# Patient Record
Sex: FEMALE | Race: WHITE | NOT HISPANIC OR LATINO
[De-identification: names, ages, dates, MRNs, and addresses within clinical notes are randomized per-mention and may not be internally consistent; named-entity substitution may affect disease eponyms.]

---

## 2019-03-05 PROBLEM — Z00.00 ENCOUNTER FOR PREVENTIVE HEALTH EXAMINATION: Status: ACTIVE | Noted: 2019-03-05

## 2019-03-06 ENCOUNTER — APPOINTMENT (OUTPATIENT)
Dept: DERMATOLOGY | Facility: CLINIC | Age: 79
End: 2019-03-06
Payer: MEDICARE

## 2019-03-06 PROCEDURE — 99203 OFFICE O/P NEW LOW 30 MIN: CPT | Mod: 25

## 2019-03-06 PROCEDURE — 11102 TANGNTL BX SKIN SINGLE LES: CPT

## 2019-04-04 ENCOUNTER — APPOINTMENT (OUTPATIENT)
Dept: DERMATOLOGY | Facility: CLINIC | Age: 79
End: 2019-04-04
Payer: MEDICARE

## 2019-04-04 PROCEDURE — 99214 OFFICE O/P EST MOD 30 MIN: CPT

## 2019-04-25 ENCOUNTER — MESSAGE (OUTPATIENT)
Age: 79
End: 2019-04-25

## 2019-04-25 ENCOUNTER — APPOINTMENT (OUTPATIENT)
Dept: DERMATOLOGY | Facility: CLINIC | Age: 79
End: 2019-04-25
Payer: MEDICARE

## 2019-04-25 PROCEDURE — 17311 MOHS 1 STAGE H/N/HF/G: CPT

## 2019-04-25 PROCEDURE — 15260 FTH/GFT FR N/E/E/L 20 SQCM/<: CPT

## 2019-05-01 ENCOUNTER — APPOINTMENT (OUTPATIENT)
Dept: DERMATOLOGY | Facility: CLINIC | Age: 79
End: 2019-05-01
Payer: MEDICARE

## 2019-05-01 PROCEDURE — 99024 POSTOP FOLLOW-UP VISIT: CPT

## 2019-06-13 ENCOUNTER — APPOINTMENT (OUTPATIENT)
Dept: DERMATOLOGY | Facility: CLINIC | Age: 79
End: 2019-06-13
Payer: MEDICARE

## 2019-06-13 PROCEDURE — 99024 POSTOP FOLLOW-UP VISIT: CPT

## 2019-08-15 ENCOUNTER — APPOINTMENT (OUTPATIENT)
Dept: DERMATOLOGY | Facility: CLINIC | Age: 79
End: 2019-08-15
Payer: MEDICARE

## 2019-08-15 PROCEDURE — 99212 OFFICE O/P EST SF 10 MIN: CPT

## 2020-01-23 ENCOUNTER — APPOINTMENT (OUTPATIENT)
Dept: DERMATOLOGY | Facility: CLINIC | Age: 80
End: 2020-01-23
Payer: MEDICARE

## 2020-01-23 DIAGNOSIS — C44.311 BASAL CELL CARCINOMA OF SKIN OF NOSE: ICD-10-CM

## 2020-01-23 PROCEDURE — 99213 OFFICE O/P EST LOW 20 MIN: CPT

## 2022-03-17 ENCOUNTER — OUTPATIENT (OUTPATIENT)
Dept: OUTPATIENT SERVICES | Facility: HOSPITAL | Age: 82
LOS: 1 days | End: 2022-03-17
Payer: MEDICARE

## 2022-03-17 DIAGNOSIS — R13.19 OTHER DYSPHAGIA: ICD-10-CM

## 2022-03-17 PROCEDURE — 74230 X-RAY XM SWLNG FUNCJ C+: CPT | Mod: 26

## 2022-03-17 PROCEDURE — 74230 X-RAY XM SWLNG FUNCJ C+: CPT

## 2022-03-17 NOTE — SWALLOW VFSS/MBS ASSESSMENT ADULT - PHARYNGEAL PHASE COMMENTS
Significant difficulty for progression of PO through pharyngeal space, almost whole bolus remaining in vallecular space. PO eventually passes, however limited, and resultant aspiration during & following the swallow observed

## 2022-03-17 NOTE — SWALLOW VFSS/MBS ASSESSMENT ADULT - RECOMMENDED CONSISTENCY
Continue NPO w/non-oral alternative means of nutrition/hydration via PEG, as per Pt/family wishes  Rx GOC discussion with MD trejo: wishes for nutrition/hydration, as Pt stating, "I don't want to live the rest of my life like this"

## 2022-03-17 NOTE — SWALLOW VFSS/MBS ASSESSMENT ADULT - DIAGNOSTIC IMPRESSIONS
Pt presents w/severe oropharyngeal dysphagia. Oral stage marked by limited mandibular depression due to trismus, resulting in poor oral grading via utensil. Decreased bolus formation/cohesion/propulsion, subsequent mod increased oral transit time. Premature pharyngeal entry noted to the level of the valleculae w/single consistency trialed puree. Additional velopharyngeal insufficiency visualized, w/retrograde progression of trials into the nasal cavity.     Pharyngeal phase of swallow marked by poor contractility, almost absent epiglottic deflection & significantly reduced hyo-laryngeal elevation/excursion. Poor progression of PO trials, initially unable to pass beyond the level of the valleculae. +Severe stasis noted, resulting in substantial aspiration w/immediate sensation, noted during & following the swallow. No benefit for airway protection despite chin tuck posture. <50% of PO able to progress through pharyngeal space following subsequent swallow, however significant residue remaining. Pt unable to clear aspirated material or stasis from laryngeal space or pharynx, despite cued effortful expectoration. Pt with significant/prolonged coughing noted at completion of study. PO initiation is contraindicated at this time.

## 2022-03-17 NOTE — SWALLOW VFSS/MBS ASSESSMENT ADULT - ORAL PHASE
Delayed oral transit time/Reduced anterior - posterior transport/Residue in oral cavity/Incomplete tongue to palate contact/Uncontrolled bolus / spillover in lali-pharynx

## 2022-03-17 NOTE — SWALLOW VFSS/MBS ASSESSMENT ADULT - SLP PERTINENT HISTORY OF CURRENT PROBLEM
Pt is an 80 y/o F with reported PMH of L breast CA, with radiation to both breast and ?L leg, unable to identify when. States hx of dysphagia since September of 2021. Pt s/p MBSV (report she had study done here - however no record of if in EMR), rx made for NPO, PEG placed at that time. Pt states she has been receiving dysphagia tx in the outpatient setting 2x a week, primary therapist referring for MBSV to determine if with improved swallow function. Pt is a questionable historian. 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 Mohs sx & reconstruction via full thickness skin graft via right pre-auricle donor in April of 2019.

## 2022-03-17 NOTE — SWALLOW VFSS/MBS ASSESSMENT ADULT - DEMONSTRATES NEED FOR REFERRAL TO ANOTHER SERVICE
rx social work/psych consult - concern for current unsafe living situation - Pt reports she lives [place] with no assist/GI/psychology/social work rx social work/psych consult - concern for current unsafe living situation - Pt reports she lives [place] with no assist/GI/neurology/psychology/social work

## 2022-03-17 NOTE — SWALLOW VFSS/MBS ASSESSMENT ADULT - SLP GENERAL OBSERVATIONS
Pt recd awake/upright in chair in radiology, awake/alert, reduced cognition, moderately dysarthric speech, significant trismus, 0/10 pain pre/post, tolerating RA no overt distress.

## 2023-07-21 ENCOUNTER — EMERGENCY (EMERGENCY)
Facility: HOSPITAL | Age: 83
LOS: 1 days | Discharge: HOME | End: 2023-07-21
Attending: EMERGENCY MEDICINE
Payer: MEDICARE

## 2023-07-21 VITALS
TEMPERATURE: 98 F | OXYGEN SATURATION: 98 % | WEIGHT: 95.9 LBS | RESPIRATION RATE: 20 BRPM | HEIGHT: 62 IN | HEART RATE: 76 BPM | DIASTOLIC BLOOD PRESSURE: 72 MMHG | SYSTOLIC BLOOD PRESSURE: 111 MMHG

## 2023-07-21 LAB
ALBUMIN SERPL ELPH-MCNC: 3.8 G/DL — SIGNIFICANT CHANGE UP (ref 3.3–5.2)
ALP SERPL-CCNC: 84 U/L — SIGNIFICANT CHANGE UP (ref 40–120)
ALT FLD-CCNC: 10 U/L — SIGNIFICANT CHANGE UP
ANION GAP SERPL CALC-SCNC: 13 MMOL/L — SIGNIFICANT CHANGE UP (ref 5–17)
APTT BLD: 32.3 SEC — SIGNIFICANT CHANGE UP (ref 27.5–35.5)
AST SERPL-CCNC: 24 U/L — SIGNIFICANT CHANGE UP
BASE EXCESS BLDV CALC-SCNC: 6.6 MMOL/L — HIGH (ref -2–3)
BASOPHILS # BLD AUTO: 0.05 K/UL — SIGNIFICANT CHANGE UP (ref 0–0.2)
BASOPHILS NFR BLD AUTO: 0.7 % — SIGNIFICANT CHANGE UP (ref 0–2)
BILIRUB SERPL-MCNC: 0.5 MG/DL — SIGNIFICANT CHANGE UP (ref 0.4–2)
BUN SERPL-MCNC: 20 MG/DL — SIGNIFICANT CHANGE UP (ref 8–20)
CA-I SERPL-SCNC: 1.17 MMOL/L — SIGNIFICANT CHANGE UP (ref 1.15–1.33)
CALCIUM SERPL-MCNC: 9.5 MG/DL — SIGNIFICANT CHANGE UP (ref 8.4–10.5)
CHLORIDE BLDV-SCNC: 104 MMOL/L — SIGNIFICANT CHANGE UP (ref 96–108)
CHLORIDE SERPL-SCNC: 102 MMOL/L — SIGNIFICANT CHANGE UP (ref 96–108)
CO2 SERPL-SCNC: 28 MMOL/L — SIGNIFICANT CHANGE UP (ref 22–29)
CREAT SERPL-MCNC: 0.59 MG/DL — SIGNIFICANT CHANGE UP (ref 0.5–1.3)
EGFR: 89 ML/MIN/1.73M2 — SIGNIFICANT CHANGE UP
EOSINOPHIL # BLD AUTO: 0.1 K/UL — SIGNIFICANT CHANGE UP (ref 0–0.5)
EOSINOPHIL NFR BLD AUTO: 1.4 % — SIGNIFICANT CHANGE UP (ref 0–6)
GAS PNL BLDV: 137 MMOL/L — SIGNIFICANT CHANGE UP (ref 136–145)
GAS PNL BLDV: SIGNIFICANT CHANGE UP
GAS PNL BLDV: SIGNIFICANT CHANGE UP
GLUCOSE BLDV-MCNC: 85 MG/DL — SIGNIFICANT CHANGE UP (ref 70–99)
GLUCOSE SERPL-MCNC: 82 MG/DL — SIGNIFICANT CHANGE UP (ref 70–99)
HCO3 BLDV-SCNC: 31 MMOL/L — HIGH (ref 22–29)
HCT VFR BLD CALC: 37.2 % — SIGNIFICANT CHANGE UP (ref 34.5–45)
HCT VFR BLDA CALC: 39 % — SIGNIFICANT CHANGE UP
HGB BLD CALC-MCNC: 12.9 G/DL — SIGNIFICANT CHANGE UP (ref 11.7–16.1)
HGB BLD-MCNC: 12 G/DL — SIGNIFICANT CHANGE UP (ref 11.5–15.5)
IMM GRANULOCYTES NFR BLD AUTO: 0.3 % — SIGNIFICANT CHANGE UP (ref 0–0.9)
INR BLD: 1.14 RATIO — SIGNIFICANT CHANGE UP (ref 0.88–1.16)
LACTATE BLDV-MCNC: 1.2 MMOL/L — SIGNIFICANT CHANGE UP (ref 0.5–2)
LIDOCAIN IGE QN: 38 U/L — SIGNIFICANT CHANGE UP (ref 22–51)
LYMPHOCYTES # BLD AUTO: 1.44 K/UL — SIGNIFICANT CHANGE UP (ref 1–3.3)
LYMPHOCYTES # BLD AUTO: 20.2 % — SIGNIFICANT CHANGE UP (ref 13–44)
MCHC RBC-ENTMCNC: 30.2 PG — SIGNIFICANT CHANGE UP (ref 27–34)
MCHC RBC-ENTMCNC: 32.3 GM/DL — SIGNIFICANT CHANGE UP (ref 32–36)
MCV RBC AUTO: 93.7 FL — SIGNIFICANT CHANGE UP (ref 80–100)
MONOCYTES # BLD AUTO: 0.68 K/UL — SIGNIFICANT CHANGE UP (ref 0–0.9)
MONOCYTES NFR BLD AUTO: 9.6 % — SIGNIFICANT CHANGE UP (ref 2–14)
NEUTROPHILS # BLD AUTO: 4.83 K/UL — SIGNIFICANT CHANGE UP (ref 1.8–7.4)
NEUTROPHILS NFR BLD AUTO: 67.8 % — SIGNIFICANT CHANGE UP (ref 43–77)
PCO2 BLDV: 48 MMHG — HIGH (ref 39–42)
PH BLDV: 7.42 — SIGNIFICANT CHANGE UP (ref 7.32–7.43)
PLATELET # BLD AUTO: 302 K/UL — SIGNIFICANT CHANGE UP (ref 150–400)
PO2 BLDV: 58 MMHG — HIGH (ref 25–45)
POTASSIUM BLDV-SCNC: 4.6 MMOL/L — SIGNIFICANT CHANGE UP (ref 3.5–5.1)
POTASSIUM SERPL-MCNC: 4.2 MMOL/L — SIGNIFICANT CHANGE UP (ref 3.5–5.3)
POTASSIUM SERPL-SCNC: 4.2 MMOL/L — SIGNIFICANT CHANGE UP (ref 3.5–5.3)
PROT SERPL-MCNC: 7.2 G/DL — SIGNIFICANT CHANGE UP (ref 6.6–8.7)
PROTHROM AB SERPL-ACNC: 13.2 SEC — SIGNIFICANT CHANGE UP (ref 10.5–13.4)
RBC # BLD: 3.97 M/UL — SIGNIFICANT CHANGE UP (ref 3.8–5.2)
RBC # FLD: 12.7 % — SIGNIFICANT CHANGE UP (ref 10.3–14.5)
SAO2 % BLDV: 90.9 % — SIGNIFICANT CHANGE UP
SODIUM SERPL-SCNC: 143 MMOL/L — SIGNIFICANT CHANGE UP (ref 135–145)
TROPONIN T SERPL-MCNC: 0.01 NG/ML — SIGNIFICANT CHANGE UP (ref 0–0.06)
WBC # BLD: 7.12 K/UL — SIGNIFICANT CHANGE UP (ref 3.8–10.5)
WBC # FLD AUTO: 7.12 K/UL — SIGNIFICANT CHANGE UP (ref 3.8–10.5)

## 2023-07-21 PROCEDURE — 99285 EMERGENCY DEPT VISIT HI MDM: CPT

## 2023-07-21 NOTE — ED ADULT TRIAGE NOTE - CHIEF COMPLAINT QUOTE
pt BIBA as per EMS NH staff states that she was yelling and was trying to leave, pt then received 2mg ativan PO. pt co hernia for 8 months said no one has checked it out. pt has Gtube. NH wants social work involved for pt

## 2023-07-21 NOTE — ED PROVIDER NOTE - OBJECTIVE STATEMENT
83-year-old female brought in by EMS for reported agitation at nursing home.  Patient on the initial presentation, says she has been having abdominal pain and swelling near her G-tube site for months.  Denies fever/chills, nausea/vomiting, chest pain, shortness of breath.

## 2023-07-21 NOTE — ED PROVIDER NOTE - ATTENDING CONTRIBUTION TO CARE
Dr. Tsai, Attending Physician-  I performed a face to face bedside interview with patient regarding history of present illness, review of symptoms and past medical history. I completed an independent physical exam.  I have discussed patient's plan of care with the resident.

## 2023-07-21 NOTE — ED PROVIDER NOTE - PATIENT PORTAL LINK FT
You can access the FollowMyHealth Patient Portal offered by [facility] by registering at the following website: [email or web address]. By joining [employer]’s FollowMyHealth portal, you will also be able to view your health information using other applications (apps) compatible with our system.

## 2023-07-21 NOTE — ED PROVIDER NOTE - PHYSICAL EXAMINATION
General: NAD  Head: NC, AT  EENT: EOMI, no scleral icterus  Cardiac: RRR, no apparent murmurs, no lower extremity edema  Respiratory: CTABL, no respiratory distress   Abdomen: soft, ND, PEG tube in place, some TTP surrounding site, otherwise clean/dry/intact, nonperitonitic  MSK/Vascular: full ROM, soft compartments, warm extremities  Neuro: AAOx3, sensation to light touch intact  Psych: calm, cooperative, appropriate affect

## 2023-07-21 NOTE — ED PROVIDER NOTE - PROGRESS NOTE DETAILS
Spoke with Elva (RN from [place]) regarding obtaining further phone numbers to obtain collateral. Pedro Small  and Carl Muñiz   Beck Dejesus MD [place]: Patient increasingly agitated, ambulating around the ED, yelling to leave. Will medicate with Zyprexa - still pending psych eval. [place]: Patient cleared by psych - recommended stopping Mirtazapine, Changing Seroquel 25 mg qhs with PRNs at 0800/0900 and 1500/1600, Continuing Depakote 250 mg BID. Social work aware.

## 2023-07-21 NOTE — ED ADULT NURSE REASSESSMENT NOTE - NS ED NURSE REASSESS COMMENT FT1
Pt is resting in bed comfortably at this time, no apparent distress noted at this time. pt safety maintained. Pt denies any complaints at this time. pt updated on plan of care. Pt awaiting CT

## 2023-07-21 NOTE — ED PROVIDER NOTE - CLINICAL SUMMARY MEDICAL DECISION MAKING FREE TEXT BOX
82 y/o female brought in from nursing home for agitation, c/o abd pain surrounding PEG tube site. Labs and CT Abd/Pelv ordered. Pt displaying no signs of agitation in the ER,  consulted for eval prior to dispo.

## 2023-07-21 NOTE — ED PROVIDER NOTE - NSFOLLOWUPINSTRUCTIONS_ED_ALL_ED_FT
Stop Mirtazapine.  Change Seroquel to 25 mg at night, with PRN dosages of 25 mg at 8am/9am and 3pm/4pm.  Continue Depakote 250 mg BID.    Patient otherwise medically cleared - can follow up with her regular doctor.

## 2023-07-21 NOTE — ED ADULT NURSE NOTE - OBJECTIVE STATEMENT
Pt care acquired at 1530. Pt is A&Ox4 and is resting in bed. Pt arrives to ED c/o hernia, and peg tube that has been in for 8 months. Pt states she has discomfort with the tube. Previous charting states pt facility wants her to stay for sw consult. Pt acting appropriate on initial assessment. pt breathing unlabored on room air. Pt denies SI/HI. VSS.

## 2023-07-21 NOTE — ED ADULT NURSE NOTE - NSFALLUNIVINTERV_ED_ALL_ED
Bed/Stretcher in lowest position, wheels locked, appropriate side rails in place/Call bell, personal items and telephone in reach/Instruct patient to call for assistance before getting out of bed/chair/stretcher/Non-slip footwear applied when patient is off stretcher/[place] to call system/Physically safe environment - no spills, clutter or unnecessary equipment/Purposeful proactive rounding/Room/bathroom lighting operational, light cord in reach No

## 2023-07-21 NOTE — ED ADULT NURSE REASSESSMENT NOTE - NS ED NURSE REASSESS COMMENT FT1
received report from day RN, assumed care of pt at change of shift.  pt here c/o hernia for 8 months that she has never been seen for.  pt also has a gtube.  denies sob, cp, nvd.  pt from NH, [facility] social work.  pending abd ct

## 2023-07-22 VITALS
SYSTOLIC BLOOD PRESSURE: 126 MMHG | HEART RATE: 85 BPM | TEMPERATURE: 99 F | OXYGEN SATURATION: 95 % | DIASTOLIC BLOOD PRESSURE: 84 MMHG | RESPIRATION RATE: 18 BRPM

## 2023-07-22 DIAGNOSIS — F03.918 UNSPECIFIED DEMENTIA, UNSPECIFIED SEVERITY, WITH OTHER BEHAVIORAL DISTURBANCE: ICD-10-CM

## 2023-07-22 LAB
APAP SERPL-MCNC: <3 UG/ML — LOW (ref 10–26)
ETHANOL SERPL-MCNC: <10 MG/DL — SIGNIFICANT CHANGE UP (ref 0–9)
SALICYLATES SERPL-MCNC: <0.6 MG/DL — LOW (ref 10–20)

## 2023-07-22 PROCEDURE — 85730 THROMBOPLASTIN TIME PARTIAL: CPT

## 2023-07-22 PROCEDURE — 85025 COMPLETE CBC W/AUTO DIFF WBC: CPT

## 2023-07-22 PROCEDURE — 93005 ELECTROCARDIOGRAM TRACING: CPT

## 2023-07-22 PROCEDURE — 84484 ASSAY OF TROPONIN QUANT: CPT

## 2023-07-22 PROCEDURE — 96372 THER/PROPH/DIAG INJ SC/IM: CPT

## 2023-07-22 PROCEDURE — 99285 EMERGENCY DEPT VISIT HI MDM: CPT | Mod: 25

## 2023-07-22 PROCEDURE — 84132 ASSAY OF SERUM POTASSIUM: CPT

## 2023-07-22 PROCEDURE — 74177 CT ABD & PELVIS W/CONTRAST: CPT | Mod: 26,MA

## 2023-07-22 PROCEDURE — 80053 COMPREHEN METABOLIC PANEL: CPT

## 2023-07-22 PROCEDURE — 82330 ASSAY OF CALCIUM: CPT

## 2023-07-22 PROCEDURE — 82435 ASSAY OF BLOOD CHLORIDE: CPT

## 2023-07-22 PROCEDURE — 90792 PSYCH DIAG EVAL W/MED SRVCS: CPT

## 2023-07-22 PROCEDURE — 74177 CT ABD & PELVIS W/CONTRAST: CPT | Mod: MA

## 2023-07-22 PROCEDURE — 82947 ASSAY GLUCOSE BLOOD QUANT: CPT

## 2023-07-22 PROCEDURE — 93010 ELECTROCARDIOGRAM REPORT: CPT

## 2023-07-22 PROCEDURE — 80307 DRUG TEST PRSMV CHEM ANLYZR: CPT

## 2023-07-22 PROCEDURE — 36415 COLL VENOUS BLD VENIPUNCTURE: CPT

## 2023-07-22 PROCEDURE — 83605 ASSAY OF LACTIC ACID: CPT

## 2023-07-22 PROCEDURE — 85018 HEMOGLOBIN: CPT

## 2023-07-22 PROCEDURE — 85014 HEMATOCRIT: CPT

## 2023-07-22 PROCEDURE — 82962 GLUCOSE BLOOD TEST: CPT

## 2023-07-22 PROCEDURE — 82803 BLOOD GASES ANY COMBINATION: CPT

## 2023-07-22 PROCEDURE — 85610 PROTHROMBIN TIME: CPT

## 2023-07-22 PROCEDURE — 83690 ASSAY OF LIPASE: CPT

## 2023-07-22 PROCEDURE — 84295 ASSAY OF SERUM SODIUM: CPT

## 2023-07-22 RX ORDER — VALPROIC ACID (AS SODIUM SALT) 250 MG/5ML
125 SOLUTION, ORAL ORAL
Refills: 0 | Status: DISCONTINUED | OUTPATIENT
Start: 2023-07-22 | End: 2023-07-29

## 2023-07-22 RX ORDER — OLANZAPINE 15 MG/1
10 TABLET, FILM COATED ORAL ONCE
Refills: 0 | Status: COMPLETED | OUTPATIENT
Start: 2023-07-22 | End: 2023-07-22

## 2023-07-22 RX ORDER — ANASTROZOLE 1 MG/1
1 TABLET ORAL DAILY
Refills: 0 | Status: DISCONTINUED | OUTPATIENT
Start: 2023-07-22 | End: 2023-07-29

## 2023-07-22 RX ORDER — LEVOTHYROXINE SODIUM 125 MCG
88 TABLET ORAL DAILY
Refills: 0 | Status: DISCONTINUED | OUTPATIENT
Start: 2023-07-22 | End: 2023-07-29

## 2023-07-22 RX ORDER — MIRTAZAPINE 45 MG/1
7.5 TABLET, ORALLY DISINTEGRATING ORAL AT BEDTIME
Refills: 0 | Status: DISCONTINUED | OUTPATIENT
Start: 2023-07-22 | End: 2023-07-29

## 2023-07-22 RX ORDER — ATENOLOL 25 MG/1
25 TABLET ORAL DAILY
Refills: 0 | Status: DISCONTINUED | OUTPATIENT
Start: 2023-07-22 | End: 2023-07-29

## 2023-07-22 RX ORDER — SODIUM CHLORIDE 9 MG/ML
1000 INJECTION INTRAMUSCULAR; INTRAVENOUS; SUBCUTANEOUS ONCE
Refills: 0 | Status: COMPLETED | OUTPATIENT
Start: 2023-07-22 | End: 2023-07-22

## 2023-07-22 RX ADMIN — SODIUM CHLORIDE 1000 MILLILITER(S): 9 INJECTION INTRAMUSCULAR; INTRAVENOUS; SUBCUTANEOUS at 12:20

## 2023-07-22 RX ADMIN — OLANZAPINE 10 MILLIGRAM(S): 15 TABLET, FILM COATED ORAL at 19:28

## 2023-07-22 RX ADMIN — OLANZAPINE 10 MILLIGRAM(S): 15 TABLET, FILM COATED ORAL at 10:09

## 2023-07-22 NOTE — ED BEHAVIORAL HEALTH NOTE - BEHAVIORAL HEALTH NOTE
of note [facility] indicates past diagnoses of: metastatic breast cancer organic mental disorder cognitive impairment dysphagia h/o radiation and aromatase therapy Mets to lung and bone

## 2023-07-22 NOTE — ED ADULT NURSE REASSESSMENT NOTE - NS ED NURSE REASSESS COMMENT FT1
Patient awake, alert, ambulating with minimal assistance, VSS, awaiting for psych consult, resp even/unlabored, will continue to monitor patient.

## 2023-07-22 NOTE — ED ADULT NURSE REASSESSMENT NOTE - NS ED NURSE REASSESS COMMENT FT1
Pt is AOx2. Pt denies pain at this time. Pt has swelling at g tube. Abdomen soft.  Pt comfortably resting at this time.

## 2023-07-22 NOTE — ED BEHAVIORAL HEALTH ASSESSMENT NOTE - HPI (INCLUDE ILLNESS QUALITY, SEVERITY, DURATION, TIMING, CONTEXT, MODIFYING FACTORS, ASSOCIATED SIGNS AND SYMPTOMS)
Patient a 82 y/o female, who was sent from [facility] for abdominal pain. Patient is alert poor orientation, able to ambulate without assistance, has a G-tube in place and takes everything through G-Tube. Patient is feisty, refusing to cooperate, Mood is irritated, annoyed and disturbed. She refused to speak with writer, takes meds as given by the G. Tube.    Collateral info obtained from RN at [facility] who informed that she has been at [place] for almost a year, and her brother once dropped her at NH refused to participate for further care. She now wants to get out of the [facility], and the SW/Nursing had to block the elevator door. She started cursing, endorses that her cousin is sleeping downstairs as if she is focused on this cousin. She has her desire to go home and this started since she is moved to long term care and her brother has not been in picture once he dropped her off. she has good sleep, and takes all food/meds through G-tube. So she has to take meds whatever is prescribed to her. 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

## 2023-07-22 NOTE — ED BEHAVIORAL HEALTH NOTE - BEHAVIORAL HEALTH NOTE
patient sedated with slurred speech after receiving IM olanzapine  will attempt to obtain information from other sources

## 2023-07-22 NOTE — ED BEHAVIORAL HEALTH ASSESSMENT NOTE - SUMMARY
Patient a 82 y/o female, who was sent from [facility] for abdominal pain. Patient is alert poor orientation, able to ambulate without assistance, has a G-tube in place and takes everything through G-Tube. Patient is feisty, refusing to cooperate, Mood is irritated, annoyed and disturbed. She refused to speak with writer, takes meds as given by the G. Tube.    Collateral info obtained from RN at [facility] who informed that she has been at [place] for almost a year, and her brother once dropped her at NH refused to participate for further care. She now wants to get out of the [facility], and the SW/Nursing had to block the elevator door. She started cursing, endorses that her cousin is sleeping downstairs as if she is focused on this cousin. She has her desire to go home and this started since she is moved to long term care and her brother has not been in picture once he dropped her off. she has good sleep, and takes all food/meds through G-tube. So she has to take meds whatever is prescribed to her. 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    Plan: Discontinue Remeron 7.5 mg HS          Seroquel 25 mg HS          Seroquel 25 mg Q-6 PRN @ 8AM a d 2 PM as needed          Valproic Acid 250 mg BID          F/U with Psychiatrist @ [facility] in 5 days after discharge

## 2023-07-22 NOTE — ED BEHAVIORAL HEALTH ASSESSMENT NOTE - MEDICATIONS (PRESCRIPTIONS, DIRECTIONS)
Seroquel 25 mg HS anmd seroquel 25 mg Q-6 PRN Agitation at 8AM an d 2 PM; Discontinue Remeron, continue Depakote 250 mg BID

## 2023-07-22 NOTE — ED ADULT NURSE REASSESSMENT NOTE - NS ED NURSE REASSESS COMMENT FT1
received report from day RN, assumed care of pt at change of shift.  pt received zyprexa at change of shift due to agitation.  pt is cleared by psych, able to return to facility.  awaiting ambulance transport.

## 2024-03-06 NOTE — SWALLOW VFSS/MBS ASSESSMENT ADULT - ORAL PREPARATORY PHASE
Patient scheduled for  on 3/11 at 10:35 am and the return ride home is scheduled for 12:30 pm.    [facility] Transportation assessment:  Is this patient an established [facility] patient?  [x] Yes [] No   Does the patient have a cancer diagnosis and needs transportation to a cancer related appointment (office visit, radiation, chemo, lab, imaging, PT, etc.)?  [x] Yes [] No   Patient is UNABLE to physically drive [x] Yes [] No   Patient is UNABLE to secure free ride(s) on their own (via family, friend, [employer] org.) [x] Yes [] No   Patient is UNABLE to afford a ride-share company or taxi  [x] Yes [] No   Patient is UNABLE to receive transportation services  through insurance [x] Yes [] No   Public transit is NOT feasible or is unavailable [x] Yes [] No   Patient is NOT in the radius for courtesy van [x] Yes [] No       If all answers above are 'yes' then this patient does qualify for [employer] transportation.   Transportation request placed on [employer]abr.    Discussed transportation resources and pt was given a copy of the resources.   Patient was instructed to carry mobile phone with them on day of transportation and was provided [employer] support contact information. [phone]   Scheduled [employer] transportation   Date & Time: 3/6/2024 1:53pm       
Poor bolus formation

## 2024-06-02 NOTE — ED BEHAVIORAL HEALTH ASSESSMENT NOTE - SELF INJURIOUS BEHAVIOR WITHOUT SUICIDAL INTENT:
Recent Labs     06/01/24  1447 06/02/24  1248   WBC 13.26* 12.29*     Patient reports he was started on Augmentin and was on day 3 for possible UTI.  UA negative on admission.  Leukocytosis in the setting of bacteremia.    Plan:  Antibiotics as bacteremia above.   None known